# Patient Record
Sex: FEMALE | Race: WHITE
[De-identification: names, ages, dates, MRNs, and addresses within clinical notes are randomized per-mention and may not be internally consistent; named-entity substitution may affect disease eponyms.]

---

## 2019-08-08 ENCOUNTER — RESULT REVIEW (OUTPATIENT)
Age: 59
End: 2019-08-08

## 2023-12-26 PROBLEM — Z00.00 ENCOUNTER FOR PREVENTIVE HEALTH EXAMINATION: Status: ACTIVE | Noted: 2023-12-26

## 2024-01-02 ENCOUNTER — APPOINTMENT (OUTPATIENT)
Dept: OTOLARYNGOLOGY | Facility: CLINIC | Age: 64
End: 2024-01-02
Payer: MEDICARE

## 2024-01-02 VITALS
SYSTOLIC BLOOD PRESSURE: 109 MMHG | HEART RATE: 90 BPM | DIASTOLIC BLOOD PRESSURE: 70 MMHG | TEMPERATURE: 97 F | BODY MASS INDEX: 31.92 KG/M2 | HEIGHT: 64 IN | OXYGEN SATURATION: 98 % | WEIGHT: 187 LBS

## 2024-01-02 DIAGNOSIS — Z78.9 OTHER SPECIFIED HEALTH STATUS: ICD-10-CM

## 2024-01-02 DIAGNOSIS — H81.11 BENIGN PAROXYSMAL VERTIGO, RIGHT EAR: ICD-10-CM

## 2024-01-02 DIAGNOSIS — T16.1XXA FOREIGN BODY IN RIGHT EAR, INITIAL ENCOUNTER: ICD-10-CM

## 2024-01-02 DIAGNOSIS — J34.89 OTHER SPECIFIED DISORDERS OF NOSE AND NASAL SINUSES: ICD-10-CM

## 2024-01-02 DIAGNOSIS — H93.11 TINNITUS, RIGHT EAR: ICD-10-CM

## 2024-01-02 DIAGNOSIS — R42 DIZZINESS AND GIDDINESS: ICD-10-CM

## 2024-01-02 PROCEDURE — 99205 OFFICE O/P NEW HI 60 MIN: CPT | Mod: 25

## 2024-01-02 PROCEDURE — 69200 CLEAR OUTER EAR CANAL: CPT | Mod: RT

## 2024-01-02 PROCEDURE — 95992 CANALITH REPOSITIONING PROC: CPT | Mod: RT

## 2024-01-02 PROCEDURE — 31231 NASAL ENDOSCOPY DX: CPT

## 2024-01-02 NOTE — HISTORY OF PRESENT ILLNESS
[de-identified] : 1/2/24: 64 y/o F presents with "dizzy spells" for the past 2 months after she had COVID. She has episodes where it feels like the room is spinning after she changes head position to the right. She reports they last for a few seconds and she can also feel lightheaded. She denies loss of consciousness, or falls. She denies changes in hearing. She hears a "dripping sound" in her right ear for the past 6 months. She reports it is low pitched, and nonpulsatile. She denies otalgia or otorrhea. Her last hearing test was many years ago, but she does not recall results.  She also presents with nasal congestion and feels her nose is obstructed right > left. She denies facial pain, rhinorrhea, or postnasal drip. She has intermittent blood tinged mucus. For treatments she has tried Clear nasal spray, and Flonase with some relief. She has history of cocaine usage.

## 2024-01-02 NOTE — PHYSICAL EXAM
[Midline] : trachea located in midline position [Normal] : tympanic membranes are normal in both ears [de-identified] : right head of qtip- removed with alligator forceps with no issues. [de-identified] : large septal perforation  [] : Tandem Romberg test is negative [de-identified] : right torsional nystagmus

## 2024-01-02 NOTE — ASSESSMENT
[FreeTextEntry1] : - 62 y/o F presents with "dizzy spells" for the past 2 months after she had COVID. On physical exam she was found to have + right Aaron Hallpike. 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 I recommended at home epley maneuver for future episodes, handout given and instructed how to perform. On exam there is evidence of right foreign body. This was cleaned today without issue. Patient noted immediate improvement back to baseline. At this time I am recommending she discontinue qtip usage.   She also presents with nasal congestion and feels her nose is obstructed right > left. On physical exam/ nasal endoscopy she was found to have large septal perforation. 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 I am recommending nasal saline rinses with baby shampoo. Consider consultation with a rhinologist in the future if she wishes to discuss surgical repair. Follow up as needed.    -Epley maneuver for future episodes -Discontinue qtip usage -Nasal rinses with baby shampoo -Follow up as needed

## 2024-01-02 NOTE — PROCEDURE
[FreeTextEntry3] :  Ear cleaning: Cerumen Removal/Ear Cleaning for Otitis Externa Pre-operative Diagnosis: Right foreign body  Post-operative Diagnosis: Same Procedure: Binocular microscopy with foreign body removal- [id number] Procedure Details: The patient was placed in the supine position. The operating microscope was positioned. I then placed the ear speculum in the EAC. Cerumen was then removed using a mixture of otologic curettes, and suction. The TM was noted to be intact. I then performed the procedure of the opposite ear in similar fashion. The patient tolerated procedure well. Findings: Bilateral Ear Canal - normal Bilateral Tympanic Membrane - normal Recommendations: Debrox Complications: None  ------------------------------------  - Procedure/Therapy Note   Pre-operative Diagnosis: Right Sided Benign Paroxysmal Positional Vertigo Post-operative Diagnosis: Right Sided Benign Paroxysmal Positional Vertigo Procedure:  Right Sided Canalith Repositioning - Epley Manuevcer  CPT code 90073  Procedure Details:   The patient was placed in the sitting position on the exam room table.  I then placed a pillow behind the patient.  I then turned the patient's head to the right and guided the patient to the supine position with quickly with their hanging off the edge of table.  At this point the patient had both rotational nystagmus as well as symptoms of vertigo.  After allowed 1 min for the symptoms to subsided, I quickly turned the patients head to the left.  Again we waited approximately 1 min for symptoms to subside.  Next, I helped roll the patient onto their left side with their head tucked into their left shoulder.  After waiting another minute the patient was guided into an upright sitting position and then their head was allowed to raise and straighten.  Condition: Stable.  Patient tolerated procedure well.  Complications: None  [FreeTextEntry6] : - Procedure Note    Pre-operative Diagnosis: Congestion, epistaxis Post-operative Diagnosis: Large septal perforation with crusting Anesthesia: Topical Procedure: Bilateral nasal endoscopy    Procedure Details:  After topical anesthesia and decongestant, the patient was placed in the supine position. The telescope was passed along the left nasal floor to the nasopharynx. It was then passed into the region of the middle meatus, middle turbinate, and the sphenoethmoid region.  An identical procedure was performed on the right side.     Findings:  Mucosa: 	                normal	 Nasal septum: 	Large perforation with crusting Discharge: 	none	 Turbinates: 	normal	 Adenoid: 	                normal	 Posterior choanae: 	normal	 Eustachian tubes: 	normal	 Mucous stranding: 	normal 	 Lesions: 	                Not present	    Comments:  Condition: Stable. Patient tolerated procedure well. Complications: None